# Patient Record
(demographics unavailable — no encounter records)

---

## 2025-01-04 NOTE — ADDENDUM
[FreeTextEntry1] : POCT glucose testing and Hemoglobin A1c was carried out today given diabetes diagnosis. Blood will be drawn in office today.  This note was written by Isabel Marquez on 01/02/2025 acting as medical scribe for Dr. uGido Bernabe. I, Dr. Guido Bernabe, have read and attest that all the information, medical decision making and discharge instructions within are true and accurate.  This note was written by Rina Noble on 01/02/2025 acting as medical scribe for Dr. Guido Bernabe. I, Dr. Guido Bernabe, have read and attest that all the information, medical decision making and discharge instructions within are true and accurate.

## 2025-01-04 NOTE — HISTORY OF PRESENT ILLNESS
[FreeTextEntry1] : Ms. MEDINA is a 73-year-old female who returns today for follow up regarding a history of thyroid cancer and type 2 diabetes mellitus.   Regarding her thyroid, the patient is s/p total thyroidectomy (per Dr. Jimenez) carried out on 01/24/2022 with results showing four foci of papillary microcarcinoma involving rt lobe, isthmus and left lobe with sizes at 2, 2, 1.0 and 0.3 mm in greatest dimensions without lymphovascular or perineural invasion. Two lymph nodes were negative for metastatic carcinoma.   The patient is currently taking Levothyroxine 150 mcg daily.  Latest TFTs stable wnl in July 2024.  However, the patient c/o headaches, anxiousness, and irritability of late and feels her LT4 dose might be too much.   She has been compliant in taking the LT4 daily, away from food or any medication that may inhibit absorption. She has tolerated this medication well without any apparent adverse effects.  She denies any temperature intolerance, significant weight changes, or severe fatigue. She in addition denies any palpitations, tremors, change in bowel habits. ____________________________________________________________________________________________________  Regarding the diabetes, there is no known history of retinopathy, or nephropathy. With regard to neuropathy, she denies any neurologic s/s.  DM medications previously included Ozempic 0.25 mg weekly, however she was not able to tolerate the higher dose due to significant GI upset.  She has since been switched to Mounjaro and is currently on 7.5mg sc weekly. Tolerating it well. - Had not lost any weight when on the Ozempic.  She denies polyuria, polydipsia, or any visual changes. She also denies any skin lesions, skin breakdown or non-healing areas of skin. She denies any podiatric concerns. Ophthalmologic evaluation is up to date- no diabetic changes noted; has stable small cataract.  She does deny any significant hypoglycemic signs and symptoms of late.  Activity: Walks whenever possible  POCT A1C returned today at 5.9%. POCT glucose today at 161mg/dL.  ____________________________________________________________________________________________________  Additional medical history includes that of, vitamin d deficiency, obesity, HTN, anxiety-on Trintellix per pcp.  - Occasional numbness left arm and rt foot-saw neurologist Dr. Diaz. All better-saw acupuncturist re apparent CTS. - Has degenerative disc dz-ongoing back pain lower and neck pains  Additional Medications: losartan-HCTZ 50/12.5 mg Qd  taking 400mg of gabapentin at this time was on a higher dose in the past. On Cosentyx monthly per Dr. Mendieta re psoriatic arthritis.  Supplements: vitamin d3 1,000 IU daily, vitamin B12, Mg, Biotin, magnesium  PMD  Dr. Whitmore.

## 2025-01-04 NOTE — ADDENDUM
[FreeTextEntry1] : POCT glucose testing and Hemoglobin A1c was carried out today given diabetes diagnosis. Blood will be drawn in office today.  This note was written by Isabel Marquez on 01/02/2025 acting as medical scribe for Dr. Guido Bernabe. I, Dr. Guido Bernabe, have read and attest that all the information, medical decision making and discharge instructions within are true and accurate.  This note was written by Rina Noble on 01/02/2025 acting as medical scribe for Dr. Guido Bernabe. I, Dr. Guido Bernabe, have read and attest that all the information, medical decision making and discharge instructions within are true and accurate.

## 2025-03-11 NOTE — HISTORY OF PRESENT ILLNESS
[de-identified] : Pt comes for med renewal  Pt having back issues and anxiety  Has been off trintellix for a while  Pt would like to try xanax prn

## 2025-03-11 NOTE — REVIEW OF SYSTEMS
[Anxiety] : anxiety [Fever] : no fever [Chills] : no chills [Chest Pain] : no chest pain [Palpitations] : no palpitations [Lower Ext Edema] : no lower extremity edema [Shortness Of Breath] : no shortness of breath [Wheezing] : no wheezing [Cough] : no cough [Abdominal Pain] : no abdominal pain [Depression] : no depression

## 2025-03-11 NOTE — PHYSICAL EXAM
[No Acute Distress] : no acute distress [Normal Sclera/Conjunctiva] : normal sclera/conjunctiva [Normal Outer Ear/Nose] : the outer ears and nose were normal in appearance [No JVD] : no jugular venous distention [No Respiratory Distress] : no respiratory distress  [No Accessory Muscle Use] : no accessory muscle use [Clear to Auscultation] : lungs were clear to auscultation bilaterally [Normal Rate] : normal rate  [Regular Rhythm] : with a regular rhythm [No Edema] : there was no peripheral edema [No Extremity Clubbing/Cyanosis] : no extremity clubbing/cyanosis [Soft] : abdomen soft [Non Tender] : non-tender [Non-distended] : non-distended [de-identified] : hunched posture

## 2025-03-31 NOTE — REASON FOR VISIT
[Follow-Up Visit] : a follow-up visit for [Back Pain] : back pain [Cervical Myelopathy/Myopathy] : cervical myelopathy/myopathy

## 2025-04-01 NOTE — DISCUSSION/SUMMARY
[Medication Risks Reviewed] : Medication risks reviewed [Surgical risks reviewed] : Surgical risks reviewed [2 Weeks] : in 2 weeks [de-identified] : Prescription for an updated MRI of the cervical spine to evaluate the stenosis possibly causing the bilateral upper extremity with complaints of numbness. Patient is to return to review the MRI and discuss the results and formulate a plan of care. Prescription for cyclobenzaprine 10 mg before bedtime, renewal from the last office visit.  I, Dr. Johan Mccray, personally performed the evaluation and management (E/M) services for this new patient.  That E/M includes conducting the initial examination, assessing all conditions, and establishing a plan of care.  Today, my ACP, Mady Snowden, was here to observe my evaluation and management services for this patient to be followed going forward.

## 2025-04-01 NOTE — PHYSICAL EXAM
[Normal DTR Reflexes] : DTR reflexes normal [Normal] : Oriented to person, place, and time, insight and judgement were intact and the affect was normal [UE] : Sensory: Intact in bilateral upper extremities [de-identified] : Active range of motion of the cervical spine is intact  [de-identified] : strong pincer  positive Tinels to the right hand Negative tension signs to the upper extremities. [de-identified] : two views of the cervical spine reveal: AP view with good alignment and noted multilevel degenerative changes. Lateral view with the level degenerative changes disc height on lower half the cervical spine. No obvious fracture, no bony tumors, no infection.  two views of the thoracic spine reveal good alignment and lateral view of noted slight improvement of the posture with a curvature of 60.7 degrees from last Xray of 64.8 degrees. No obvious fracture, no bony tumors no infection.

## 2025-04-01 NOTE — HISTORY OF PRESENT ILLNESS
[Pain Location] : pain [C-Spine] : C-spine region [T-Spine] : T-spine region [Worsening] : worsening [9] : a current pain level of 9/10 [Lifting] : lifting [Daily] : ~He/She~ states the symptoms seem to be occuring daily [de-identified] : Patient  here for a follow up of the cervical and thoracic spine. Patient reports of undergoing a spring cleaning of the house and noted weakness and pain. Patient is currently taking Diclofenac and Tylenol to get through the day

## 2025-04-01 NOTE — DISCUSSION/SUMMARY
[Medication Risks Reviewed] : Medication risks reviewed [Surgical risks reviewed] : Surgical risks reviewed [2 Weeks] : in 2 weeks [de-identified] : Prescription for an updated MRI of the cervical spine to evaluate the stenosis possibly causing the bilateral upper extremity with complaints of numbness. Patient is to return to review the MRI and discuss the results and formulate a plan of care. Prescription for cyclobenzaprine 10 mg before bedtime, renewal from the last office visit.  I, Dr. Johan Mccray, personally performed the evaluation and management (E/M) services for this new patient.  That E/M includes conducting the initial examination, assessing all conditions, and establishing a plan of care.  Today, my ACP, Mady Snowden, was here to observe my evaluation and management services for this patient to be followed going forward.

## 2025-04-01 NOTE — PHYSICAL EXAM
[Normal DTR Reflexes] : DTR reflexes normal [Normal] : Oriented to person, place, and time, insight and judgement were intact and the affect was normal [UE] : Sensory: Intact in bilateral upper extremities [de-identified] : Active range of motion of the cervical spine is intact  [de-identified] : strong pincer  positive Tinels to the right hand Negative tension signs to the upper extremities. [de-identified] : two views of the cervical spine reveal: AP view with good alignment and noted multilevel degenerative changes. Lateral view with the level degenerative changes disc height on lower half the cervical spine. No obvious fracture, no bony tumors, no infection.  two views of the thoracic spine reveal good alignment and lateral view of noted slight improvement of the posture with a curvature of 60.7 degrees from last Xray of 64.8 degrees. No obvious fracture, no bony tumors no infection.

## 2025-04-01 NOTE — HISTORY OF PRESENT ILLNESS
[Pain Location] : pain [C-Spine] : C-spine region [T-Spine] : T-spine region [Worsening] : worsening [9] : a current pain level of 9/10 [Lifting] : lifting [Daily] : ~He/She~ states the symptoms seem to be occuring daily [de-identified] : Patient  here for a follow up of the cervical and thoracic spine. Patient reports of undergoing a spring cleaning of the house and noted weakness and pain. Patient is currently taking Diclofenac and Tylenol to get through the day

## 2025-06-03 NOTE — REVIEW OF SYSTEMS
[Fever] : no fever [Chills] : no chills [Dysuria] : no dysuria [Incontinence] : no incontinence [Hematuria] : no hematuria [Frequency] : no frequency [Negative] : Gastrointestinal

## 2025-06-03 NOTE — PLAN
Accession (Optional): [id number] [FreeTextEntry1] : studies ordered   no treatment as pt has no current symptoms

## 2025-06-03 NOTE — PHYSICAL EXAM
[No Acute Distress] : no acute distress [Normal Sclera/Conjunctiva] : normal sclera/conjunctiva [Normal Outer Ear/Nose] : the outer ears and nose were normal in appearance [No JVD] : no jugular venous distention [No Respiratory Distress] : no respiratory distress  [No Spinal Tenderness] : no spinal tenderness [de-identified] : no cva tenderness

## 2025-06-03 NOTE — HISTORY OF PRESENT ILLNESS
[FreeTextEntry8] : Pt comes for left flank pain radiating around to front with some nausea  Went to er and givien iv tylenol and lumbar spine xrays and ekg done and pt sent home  Sistr has hx of kidney stones

## 2025-06-11 NOTE — DISCUSSION/SUMMARY
[Medication Risks Reviewed] : Medication risks reviewed [de-identified] :  I have discussed the underlying pathophysiology of the patient's condition and MRI findings which is related to worsening degenerative disease without significant central stenosis with cord changes.  Patient also has foraminal narrowing noted at C5-6 and C6-7. Patient should follow up with her PCP and endocrinologist regarding a bone density test due to finding of a wedge compression fx at T2 vertebrae which is old to make sure the patient does not have underlying osteopenia or osteoporosis. Patient was provided with an updated prescription for cyclobenzaprine. The patient should follow up with me as needed.   [Other: ____] : in [unfilled]

## 2025-06-11 NOTE — HISTORY OF PRESENT ILLNESS
[Pain Location] : pain [C-Spine] : C-spine region [0] : a current pain level of 0/10 [Acetaminophen] : relieved by acetaminophen [de-identified] : Patient is here today to review the recent results of an MRI for cervical spine.  Patient notes improvement in symptoms since her last visit. She continues to have tingling in her right hand.

## 2025-06-11 NOTE — ADDENDUM
[FreeTextEntry1] :  I, Kala Gallardod, acted solely as a scribe for Dr. Johan Mccray on this date 06/11/2025 .  All medical records entries made by the Scribe were at my Dr. Johan Mccray direction and personally dictated by me on 06/11/2025. I have reviewed the chart and agree that the record accurately reflects my personal performance of the history, physical exam, assessment and plan. I have also personally directed, reviewed, and agreed with the chart.

## 2025-06-11 NOTE — PHYSICAL EXAM
[UE] : 5/5 motor strength in bilateral upper extremities [Normal DTR Reflexes] : DTR reflexes normal [Normal] : Oriented to person, place, and time, insight and judgement were intact and the affect was normal [de-identified] : Active range of motion of the cervical spine is intact  [de-identified] : strong pincer  positive Tinels to the right hand Negative tension signs to the upper extremities. [de-identified] : MRI Evaluation of the Lumbar spine taken on 06/11/2025 shows multilevel degenerative changes with foraminal narrowing and moderate central stenosis at C4-5, bilateral narrowing of the neural foramen at C5-6 with disc osteophyte complex abutting cervical cord, mild central bilateral foraminal narrowing at C5-6 C6-7, chronic wedge compression detected at T2. Progressive chronic changes compared to April 2022. No tumor, no infection and no signal cord compression.

## 2025-06-21 NOTE — ADDENDUM
[FreeTextEntry1] : POCT glucose testing and Hemoglobin A1c was carried out today given diabetes diagnosis.  This note was written by Isabel Marquez on 06/12/2025 acting as medical scribe for Dr. Guido Bernabe. I, Dr. Guido Bernabe, have read and attest that all the information, medical decision making and discharge instructions within are true and accurate.

## 2025-06-21 NOTE — HISTORY OF PRESENT ILLNESS
[FreeTextEntry1] : Ms. MEDINA is a 74-year-old female who returns today for follow up regarding a history of thyroid cancer and type 2 diabetes mellitus.   Regarding her thyroid, the patient is s/p total thyroidectomy (per Dr. Jimenez) carried out on 01/24/2022 with results showing four foci of papillary microcarcinoma involving rt lobe, isthmus and left lobe with sizes at 2, 2, 1.0 and 0.3 mm in greatest dimensions without lymphovascular or perineural invasion. Two lymph nodes were negative for metastatic carcinoma.   The patient is currently taking Levothyroxine 150 mcg daily.  Latest TFTs stable wnl on 04/24/25 with TSH at 0.74, FT4 at 1.8 and FT3 at 2.94. TG remains suppressed with neg TG antibodies.  She has been compliant in taking the LT4 daily, away from food or any medication that may inhibit absorption. She has tolerated this medication well without any apparent adverse effects.  She denies any temperature intolerance, significant weight changes, or severe fatigue. She in addition denies any palpitations, tremors, change in bowel habits.  She continues to note increased anxiety, increased hair thinning and trouble sleeping. ____________________________________________________________________________________________________  Regarding the diabetes, there is no known history of retinopathy, or nephropathy. With regard to neuropathy, she denies any neurologic s/s.  DM medications previously included Ozempic 0.25 mg weekly, however she was not able to tolerate the higher dose due to significant GI upset.  She has since been switched to Mounjaro and is currently on 10mg sc weekly. Tolerating it well. - Had not lost any weight when on the Ozempic.  She denies polyuria, polydipsia, or any visual changes. She also denies any skin lesions, skin breakdown or non-healing areas of skin. She denies any podiatric concerns. Ophthalmologic evaluation is up to date- no diabetic changes noted; has stable small cataract.  She does deny any significant hypoglycemic signs and symptoms of late.  Activity: Walks whenever possible  POCT A1C returned today at 5.8%, previously 5.8% on 01/02/25. POCT glucose today at 174 mg/dL. ____________________________________________________________________________________________________ Has history of Osteoporosis.  Latest DEXA scan from 09/18/24 did show T-scores: Spine: 0.9 L Femoral neck: -2.5 L Total hip: -2.2  The patient recalls having severe back pain in the mid to late 1990s and was treated with Vioxx. She reports of a past history of osteonecrosis of her right shoulder which required shoulder replacement surgery. Has hx of bronchitis and a cracked rib.  Has taken cortisone for psoriasis in the past. Continues on Cosentyx infusions.  She was recently at [facility] on 05/19/25 for left flank pain that radiated to her front and was given IV Acetaminophen. She was discharged and told that the etiology was unknown. Per patient, her internist believes that she may have passed a kidney stone. She needs to undergo an US for her kidney and too wants her urine sample checked.  Has family history of kidney stones with her sister having a staghorn calculus in the past. ____________________________________________________________________________________________________ Additional medical history includes that of, vitamin d deficiency, obesity, hypertension, anxiety-on Trintellix per pcp.  - Occasional numbness left arm and rt foot-saw neurologist Dr. Diaz. All better-saw acupuncturist re apparent CTS. - Has degenerative disc dz-ongoing back pain lower and neck pains  Additional Medications: Losartan-HCTZ 50/12.5 mg Qd  taking 400mg of gabapentin at this time was on a higher dose in the past. On Cosentyx monthly per Dr. Mendieta re psoriatic arthritis.  Supplements: vitamin d3 1,000 IU QOD, vitamin B12, magnesium, hair vitamin  PMD Dr. Whitmore.

## 2025-06-21 NOTE — HISTORY OF PRESENT ILLNESS
[FreeTextEntry1] : Ms. MEDINA is a 74-year-old female who returns today for follow up regarding a history of thyroid cancer and type 2 diabetes mellitus.   Regarding her thyroid, the patient is s/p total thyroidectomy (per Dr. Jimenez) carried out on 01/24/2022 with results showing four foci of papillary microcarcinoma involving rt lobe, isthmus and left lobe with sizes at 2, 2, 1.0 and 0.3 mm in greatest dimensions without lymphovascular or perineural invasion. Two lymph nodes were negative for metastatic carcinoma.   The patient is currently taking Levothyroxine 150 mcg daily.  Latest TFTs stable wnl on 04/24/25 with TSH at 0.74, FT4 at 1.8 and FT3 at 2.94. TG remains suppressed with neg TG antibodies.  She has been compliant in taking the LT4 daily, away from food or any medication that may inhibit absorption. She has tolerated this medication well without any apparent adverse effects.  She denies any temperature intolerance, significant weight changes, or severe fatigue. She in addition denies any palpitations, tremors, change in bowel habits.  She continues to note increased anxiety, increased hair thinning and trouble sleeping. ____________________________________________________________________________________________________  Regarding the diabetes, there is no known history of retinopathy, or nephropathy. With regard to neuropathy, she denies any neurologic s/s.  DM medications previously included Ozempic 0.25 mg weekly, however she was not able to tolerate the higher dose due to significant GI upset.  She has since been switched to Mounjaro and is currently on 10mg sc weekly. Tolerating it well. - Had not lost any weight when on the Ozempic.  She denies polyuria, polydipsia, or any visual changes. She also denies any skin lesions, skin breakdown or non-healing areas of skin. She denies any podiatric concerns. Ophthalmologic evaluation is up to date- no diabetic changes noted; has stable small cataract.  She does deny any significant hypoglycemic signs and symptoms of late.  Activity: Walks whenever possible  POCT A1C returned today at 5.8%, previously 5.8% on 01/02/25. POCT glucose today at 174 mg/dL. ____________________________________________________________________________________________________ Has history of Osteoporosis.  Latest DEXA scan from 09/18/24 did show T-scores: Spine: 0.9 L Femoral neck: -2.5 L Total hip: -2.2  The patient recalls having severe back pain in the mid to late 1990s and was treated with Vioxx. She reports of a past history of osteonecrosis of her right shoulder which required shoulder replacement surgery. Has hx of bronchitis and a cracked rib.  Has taken cortisone for psoriasis in the past. Continues on Cosentyx infusions.  She was recently at [facility] on 05/19/25 for left flank pain that radiated to her front and was given IV Acetaminophen. She was discharged and told that the etiology was unknown. Per patient, her internist believes that she may have passed a kidney stone. She needs to undergo an US for her kidney and too wants her urine sample checked.  Has family history of kidney stones with her sister having a staghorn calculus in the past. ____________________________________________________________________________________________________ Additional medical history includes that of, vitamin d deficiency, obesity, hypertension, anxiety-on Trintellix per pcp.  - Occasional numbness left arm and rt foot-saw neurologist Dr. Diaz. All better-saw acupuncturist re apparent CTS. - Has degenerative disc dz-ongoing back pain lower and neck pains  Additional Medications: Losartan-HCTZ 50/12.5 mg Qd  taking 400mg of gabapentin at this time was on a higher dose in the past. On Cosentyx monthly per Dr. Mendieta re psoriatic arthritis.  Supplements: vitamin d3 1,000 IU QOD, vitamin B12, magnesium, hair vitamin  PMD Dr. Whitmore.